# Patient Record
Sex: FEMALE | Race: WHITE | NOT HISPANIC OR LATINO | Employment: FULL TIME | URBAN - METROPOLITAN AREA
[De-identification: names, ages, dates, MRNs, and addresses within clinical notes are randomized per-mention and may not be internally consistent; named-entity substitution may affect disease eponyms.]

---
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
## 2020-11-09 LAB — POC BETA-HCG (QUAL): POSITIVE

## 2020-12-18 ENCOUNTER — HISTORICAL (OUTPATIENT)
Dept: ADMINISTRATIVE | Facility: HOSPITAL | Age: 20
End: 2020-12-18

## 2020-12-18 LAB
ABS NEUT (OLG): 7.01 X10(3)/MCL (ref 2.1–9.2)
AMPHET UR QL SCN: NEGATIVE
BARBITURATE SCN PRESENT UR: NEGATIVE
BASOPHILS # BLD AUTO: 0 X10(3)/MCL (ref 0–0.2)
BASOPHILS NFR BLD AUTO: 0 %
BENZODIAZ UR QL SCN: NEGATIVE
CANNABINOIDS UR QL SCN: NEGATIVE
COCAINE UR QL SCN: NEGATIVE
EOSINOPHIL # BLD AUTO: 0.3 X10(3)/MCL (ref 0–0.9)
EOSINOPHIL NFR BLD AUTO: 3 %
ERYTHROCYTE [DISTWIDTH] IN BLOOD BY AUTOMATED COUNT: 13.5 % (ref 11.5–17)
GROUP & RH: NORMAL
HBV SURFACE AG SERPL QL IA: NONREACTIVE
HCT VFR BLD AUTO: 34.5 % (ref 37–47)
HCV AB SERPL QL IA: NONREACTIVE
HGB BLD-MCNC: 11.3 GM/DL (ref 12–16)
HIV 1+2 AB+HIV1 P24 AG SERPL QL IA: NONREACTIVE
LYMPHOCYTES # BLD AUTO: 1.8 X10(3)/MCL (ref 0.6–4.6)
LYMPHOCYTES NFR BLD AUTO: 19 %
MCH RBC QN AUTO: 28.1 PG (ref 27–31)
MCHC RBC AUTO-ENTMCNC: 32.8 GM/DL (ref 33–36)
MCV RBC AUTO: 85.8 FL (ref 80–94)
MONOCYTES # BLD AUTO: 0.5 X10(3)/MCL (ref 0.1–1.3)
MONOCYTES NFR BLD AUTO: 5 %
NEUTROPHILS # BLD AUTO: 7.01 X10(3)/MCL (ref 2.1–9.2)
NEUTROPHILS NFR BLD AUTO: 72 %
OPIATES UR QL SCN: NEGATIVE
PCP UR QL: NEGATIVE
PH UR STRIP.AUTO: 5 [PH] (ref 5–7.5)
PLATELET # BLD AUTO: 318 X10(3)/MCL (ref 130–400)
PMV BLD AUTO: 10 FL (ref 9.4–12.4)
RBC # BLD AUTO: 4.02 X10(6)/MCL (ref 4.2–5.4)
SP GR FLD REFRACTOMETRY: >1.03 (ref 1–1.03)
T PALLIDUM AB SER QL: NONREACTIVE
T4 FREE SERPL-MCNC: 0.88 NG/DL (ref 0.7–1.48)
TSH SERPL-ACNC: 1.82 UIU/ML (ref 0.35–4.94)
WBC # SPEC AUTO: 9.7 X10(3)/MCL (ref 4.5–11.5)

## 2021-03-29 ENCOUNTER — HISTORICAL (OUTPATIENT)
Dept: ADMINISTRATIVE | Facility: HOSPITAL | Age: 21
End: 2021-03-29

## 2021-03-29 LAB — GLUCOSE 1H P 100 G GLC PO SERPL-MCNC: 111 MG/DL (ref 100–180)

## 2021-06-04 ENCOUNTER — HISTORICAL (OUTPATIENT)
Dept: ADMINISTRATIVE | Facility: HOSPITAL | Age: 21
End: 2021-06-04

## 2021-06-04 LAB
ABS NEUT (OLG): 5.56 X10(3)/MCL (ref 2.1–9.2)
BASOPHILS # BLD AUTO: 0 X10(3)/MCL (ref 0–0.2)
BASOPHILS NFR BLD AUTO: 0 %
EOSINOPHIL # BLD AUTO: 0.1 X10(3)/MCL (ref 0–0.9)
EOSINOPHIL NFR BLD AUTO: 2 %
ERYTHROCYTE [DISTWIDTH] IN BLOOD BY AUTOMATED COUNT: 14.5 % (ref 11.5–17)
HCT VFR BLD AUTO: 29.1 % (ref 37–47)
HGB BLD-MCNC: 9.8 GM/DL (ref 12–16)
HIV 1+2 AB+HIV1 P24 AG SERPL QL IA: NONREACTIVE
LYMPHOCYTES # BLD AUTO: 1.5 X10(3)/MCL (ref 0.6–4.6)
LYMPHOCYTES NFR BLD AUTO: 20 %
MCH RBC QN AUTO: 27.5 PG (ref 27–31)
MCHC RBC AUTO-ENTMCNC: 33.7 GM/DL (ref 33–36)
MCV RBC AUTO: 81.7 FL (ref 80–94)
MONOCYTES # BLD AUTO: 0.4 X10(3)/MCL (ref 0.1–1.3)
MONOCYTES NFR BLD AUTO: 6 %
NEUTROPHILS # BLD AUTO: 5.56 X10(3)/MCL (ref 2.1–9.2)
NEUTROPHILS NFR BLD AUTO: 72 %
PLATELET # BLD AUTO: 280 X10(3)/MCL (ref 130–400)
PMV BLD AUTO: 11 FL (ref 9.4–12.4)
RBC # BLD AUTO: 3.56 X10(6)/MCL (ref 4.2–5.4)
T PALLIDUM AB SER QL: NONREACTIVE
WBC # SPEC AUTO: 7.7 X10(3)/MCL (ref 4.5–11.5)

## 2021-06-07 LAB
GLUCOSE UR QL STRIP: NEGATIVE
PROTEIN, POC: NEGATIVE

## 2021-06-30 ENCOUNTER — HOSPITAL ENCOUNTER (OUTPATIENT)
Dept: NUTRITION | Facility: HOSPITAL | Age: 21
End: 2021-07-02
Attending: SURGERY | Admitting: SURGERY

## 2021-06-30 LAB
ABS NEUT (OLG): 6.34 X10(3)/MCL (ref 2.1–9.2)
ALBUMIN SERPL-MCNC: 3.1 GM/DL (ref 3.5–5)
ALBUMIN/GLOB SERPL: 0.6 RATIO (ref 1.1–2)
ALP SERPL-CCNC: 102 UNIT/L (ref 40–150)
ALT SERPL-CCNC: 15 UNIT/L (ref 0–55)
APPEARANCE, UA: ABNORMAL
AST SERPL-CCNC: 14 UNIT/L (ref 5–34)
B-HCG SERPL QL: NEGATIVE
BACTERIA SPEC CULT: ABNORMAL /HPF
BASOPHILS # BLD AUTO: 0 X10(3)/MCL (ref 0–0.2)
BASOPHILS NFR BLD AUTO: 1 %
BILIRUB SERPL-MCNC: 0.2 MG/DL
BILIRUB UR QL STRIP: NEGATIVE
BILIRUBIN DIRECT+TOT PNL SERPL-MCNC: 0.1 MG/DL (ref 0–0.5)
BILIRUBIN DIRECT+TOT PNL SERPL-MCNC: 0.1 MG/DL (ref 0–0.8)
BUN SERPL-MCNC: 10 MG/DL (ref 7–18.7)
CALCIUM SERPL-MCNC: 9.7 MG/DL (ref 8.4–10.2)
CHLORIDE SERPL-SCNC: 106 MMOL/L (ref 98–107)
CO2 SERPL-SCNC: 26 MMOL/L (ref 22–29)
COLOR UR: YELLOW
CREAT SERPL-MCNC: 0.73 MG/DL (ref 0.55–1.02)
EOSINOPHIL # BLD AUTO: 0.2 X10(3)/MCL (ref 0–0.9)
EOSINOPHIL NFR BLD AUTO: 3 %
ERYTHROCYTE [DISTWIDTH] IN BLOOD BY AUTOMATED COUNT: 13.9 % (ref 11.5–17)
GLOBULIN SER-MCNC: 5 GM/DL (ref 2.4–3.5)
GLUCOSE (UA): NEGATIVE
GLUCOSE SERPL-MCNC: 84 MG/DL (ref 74–100)
HCT VFR BLD AUTO: 34 % (ref 37–47)
HGB BLD-MCNC: 10.5 GM/DL (ref 12–16)
HGB UR QL STRIP: ABNORMAL
KETONES UR QL STRIP: NEGATIVE
LDH SERPL-CCNC: 187 UNIT/L (ref 140–271)
LEUKOCYTE ESTERASE UR QL STRIP: ABNORMAL
LIPASE SERPL-CCNC: 19 U/L
LYMPHOCYTES # BLD AUTO: 1.6 X10(3)/MCL (ref 0.6–4.6)
LYMPHOCYTES NFR BLD AUTO: 18 %
MAGNESIUM SERPL-MCNC: 2.1 MG/DL (ref 1.6–2.6)
MCH RBC QN AUTO: 26.6 PG (ref 27–31)
MCHC RBC AUTO-ENTMCNC: 30.9 GM/DL (ref 33–36)
MCV RBC AUTO: 86.1 FL (ref 80–94)
MONOCYTES # BLD AUTO: 0.5 X10(3)/MCL (ref 0.1–1.3)
MONOCYTES NFR BLD AUTO: 6 %
NEUTROPHILS # BLD AUTO: 6.34 X10(3)/MCL (ref 2.1–9.2)
NEUTROPHILS NFR BLD AUTO: 72 %
NITRITE UR QL STRIP: NEGATIVE
PH UR STRIP: 6.5 [PH] (ref 5–9)
PLATELET # BLD AUTO: 518 X10(3)/MCL (ref 130–400)
PMV BLD AUTO: 10.4 FL (ref 9.4–12.4)
POTASSIUM SERPL-SCNC: 4.2 MMOL/L (ref 3.5–5.1)
PROT SERPL-MCNC: 8.1 GM/DL (ref 6.4–8.3)
PROT UR QL STRIP: NEGATIVE
RBC # BLD AUTO: 3.95 X10(6)/MCL (ref 4.2–5.4)
RBC #/AREA URNS HPF: ABNORMAL /[HPF]
SODIUM SERPL-SCNC: 142 MMOL/L (ref 136–145)
SP GR UR STRIP: 1.02 (ref 1–1.03)
SQUAMOUS EPITHELIAL, UA: 13 /HPF (ref 0–4)
UROBILINOGEN UR STRIP-ACNC: 0.2
WBC # SPEC AUTO: 8.8 X10(3)/MCL (ref 4.5–11.5)
WBC #/AREA URNS HPF: 27 /HPF (ref 0–3)

## 2021-07-01 LAB — SARS-COV-2 AG RESP QL IA.RAPID: NEGATIVE

## 2021-07-02 LAB
ABS NEUT (OLG): 8.17 X10(3)/MCL (ref 2.1–9.2)
ALBUMIN SERPL-MCNC: 2.9 GM/DL (ref 3.5–5)
ALBUMIN/GLOB SERPL: 0.7 RATIO (ref 1.1–2)
ALP SERPL-CCNC: 258 UNIT/L (ref 40–150)
ALT SERPL-CCNC: 86 UNIT/L (ref 0–55)
AST SERPL-CCNC: 221 UNIT/L (ref 5–34)
BASOPHILS # BLD AUTO: 0 X10(3)/MCL (ref 0–0.2)
BASOPHILS NFR BLD AUTO: 0 %
BILIRUB SERPL-MCNC: 0.9 MG/DL
BILIRUBIN DIRECT+TOT PNL SERPL-MCNC: 0.4 MG/DL (ref 0–0.8)
BILIRUBIN DIRECT+TOT PNL SERPL-MCNC: 0.5 MG/DL (ref 0–0.5)
BUN SERPL-MCNC: 5.9 MG/DL (ref 7–18.7)
CALCIUM SERPL-MCNC: 9.4 MG/DL (ref 8.4–10.2)
CHLORIDE SERPL-SCNC: 106 MMOL/L (ref 98–107)
CO2 SERPL-SCNC: 23 MMOL/L (ref 22–29)
CREAT SERPL-MCNC: 0.76 MG/DL (ref 0.55–1.02)
ERYTHROCYTE [DISTWIDTH] IN BLOOD BY AUTOMATED COUNT: 13.7 % (ref 11.5–17)
EST CREAT CLEARANCE SER (OHS): 83.44 ML/MIN
FINAL CULTURE: NO GROWTH
GLOBULIN SER-MCNC: 4.2 GM/DL (ref 2.4–3.5)
GLUCOSE SERPL-MCNC: 110 MG/DL (ref 74–100)
HCT VFR BLD AUTO: 33 % (ref 37–47)
HGB BLD-MCNC: 10.3 GM/DL (ref 12–16)
LACTATE SERPL-SCNC: 1.3 MMOL/L (ref 0.5–2.2)
LACTATE SERPL-SCNC: 2.4 MMOL/L (ref 0.5–2.2)
LYMPHOCYTES # BLD AUTO: 1.2 X10(3)/MCL (ref 0.6–4.6)
LYMPHOCYTES NFR BLD AUTO: 12 %
MCH RBC QN AUTO: 26.8 PG (ref 27–31)
MCHC RBC AUTO-ENTMCNC: 31.2 GM/DL (ref 33–36)
MCV RBC AUTO: 85.7 FL (ref 80–94)
MONOCYTES # BLD AUTO: 0.7 X10(3)/MCL (ref 0.1–1.3)
MONOCYTES NFR BLD AUTO: 7 %
NEUTROPHILS # BLD AUTO: 8.17 X10(3)/MCL (ref 2.1–9.2)
NEUTROPHILS NFR BLD AUTO: 80 %
PLATELET # BLD AUTO: 497 X10(3)/MCL (ref 130–400)
PMV BLD AUTO: 10.3 FL (ref 9.4–12.4)
POTASSIUM SERPL-SCNC: 4.5 MMOL/L (ref 3.5–5.1)
PROT SERPL-MCNC: 7.1 GM/DL (ref 6.4–8.3)
RBC # BLD AUTO: 3.85 X10(6)/MCL (ref 4.2–5.4)
SODIUM SERPL-SCNC: 141 MMOL/L (ref 136–145)
WBC # SPEC AUTO: 10.2 X10(3)/MCL (ref 4.5–11.5)

## 2021-07-27 LAB — POC BETA-HCG (QUAL): NEGATIVE

## 2021-09-01 LAB — POC BETA-HCG (QUAL): NEGATIVE

## 2021-10-13 LAB — POC BETA-HCG (QUAL): NEGATIVE

## 2022-04-09 ENCOUNTER — HISTORICAL (OUTPATIENT)
Dept: ADMINISTRATIVE | Facility: HOSPITAL | Age: 22
End: 2022-04-09

## 2022-04-29 VITALS
HEIGHT: 60 IN | WEIGHT: 228 LBS | SYSTOLIC BLOOD PRESSURE: 130 MMHG | BODY MASS INDEX: 44.76 KG/M2 | DIASTOLIC BLOOD PRESSURE: 69 MMHG

## 2022-04-30 NOTE — OP NOTE
DATE OF SURGERY:    07/01/2021    SURGEON:  Chilo Alvares MD  ASSISTANT:  Arti Camacho House Officer 2    PROCEDURE:  Laparoscopic cholecystectomy.    PREOPERATIVE DIAGNOSIS:  Acute biliary colic from cholelithiasis.    POSTOPERATIVE DIAGNOSIS:  Acute biliary colic from cholelithiasis.    ANESTHESIA:  General endotracheal anesthesia with local at the sites.    COMPLICATIONS:  None.    ESTIMATED BLOOD LOSS:  Ten.    CONDITION:  Good.    SPECIMEN:  Gallbladder.    INDICATION FOR PROCEDURE:  21-year-old female with severe symptoms from cholelithiasis.  We will take her to the operating room for a laparoscopic cholecystectomy.    FINDINGS:  Mildly inflamed gallbladder.  Normal biliary anatomy.  No other abnormalities.    PROCEDURE IN DETAIL:  The patient was brought to the operating room.  She was brought under general endotracheal anesthesia.  She was prepped and draped in sterile fashion.  Antibiotics were given.  SCDs were in place and a timeout was called.  Supraumbilical Veress access to the abdomen was performed with a water drop test.  There was no evidence of bowel or trocar injury.  The abdomen was insufflated to 15 mmHg.  Three 5 mm ports and a subxiphoid 11 mm port were placed.  The gallbladder was retracted.  There was some mild edema, but for the most part, it was more consistent with acute biliary colic.  The triangle of Calot was easily dissected out.  There was an obvious cystic duct and cystic artery. Cystic duct was clipped 3 times proximally, once distally, and transected.  The cystic artery was clipped twice proximally, once distally, and transected.  The gallbladder was removed from the gallbladder bed with the Bovie cautery.  The gallbladder was removed from the abdomen with the EndoCatch bag.  The liver bed was dry.  The clips were intact.  There was no other pathology noted.  The abdomen was desufflated.  The port sites were removed.  The port sites were closed with 4-0 Vicryl.  The  subxiphoid port     was closed with 0 Vicryl in the fascia.  The patient tolerated the procedure well, was awoken without difficulty, and brought to recovery room without further event.        ______________________________  MD WALESKA Hugo/  DD:  07/01/2021  Time:  04:22PM  DT:  07/01/2021  Time:  04:43PM  Job #:  [id number]

## 2022-04-30 NOTE — ED PROVIDER NOTES
"   Patient:   Sammi Marcelino             MRN: [id number]            FIN: [id number]               Age:   21 years     Sex:  Female     :  2000   Associated Diagnoses:   None   Author:   Obey Goff MD      Basic Information   Time seen: Date & time 2021 19:06:00.   History source: Patient.   Arrival mode: Private vehicle, walking.   History limitation: None.   Additional information: Chief Complaint from Nursing Triage Note : Chief Complaint   2021 19:05 CDT      Chief Complaint           Pt. c/o midline upper abdominal pain worse after eating and R sided "rib pain," worsening after deep breathing x 1 week. Pt. reports  x 3 weeks ago.  .      History of Present Illness   The patient presents with abdominal pain, SOrT: AAOx4 female 3 weeks post partum  ambulatory into triage with c/o epigastric pain over the last week.  [facility]ALAV[facility] and     I, Dr. Goff, assumed care of this patient at 21:15.    22 y/o female presents to the ED with complaints of RUQ abdominal pain. Pt had a  3 weeks ago but notes that the pain did not start until 1 week ago. She denies nausea and vomiting and notes that the pain comes after eating..  The onset was 1  weeks ago.  The course/duration of symptoms is constant.  The character of symptoms is "pain".  The degree at onset was moderate.  The Location of pain at onset was right, upper and abdominal.  The degree at present is moderate.  The Location of pain at present is right, upper and abdominal.  Radiating pain: none. The exacerbating factor is eating.  The relieving factor is none.  Therapy today: none.  Risk factors consist of recent surgery.  Associated symptoms: denies nausea and denies vomiting.        Review of Systems   Constitutional symptoms:  No fever, no chills, no sweats, no weakness   Skin symptoms:  No rash,    Eye symptoms:  No recent vision problems   ENMT symptoms:  No ear pain,    Respiratory symptoms:  No shortness " of breath, no orthopnea   Cardiovascular symptoms:  No chest pain, no palpitations   Gastrointestinal symptoms:  RUQ pain, no nausea, no vomiting, no diarrhea   Genitourinary symptoms:  No dysuria, no hematuria.    Musculoskeletal symptoms:  No back pain, no Muscle pain.    Psychiatric symptoms:  No anxiety, no depression.    Allergy/immunologic symptoms:  No seasonal allergies, no food allergies             Additional review of systems information: All other systems reviewed and otherwise negative.      Health Status   Allergies:    Allergic Reactions (Selected)  Severity Not Documented  Cinnamon- Tingling.  No Known Medication Allergies.   Medications:  (Selected)   Prescriptions  Prescribed  Classic Prenatal oral tablet: 1 tab(s), Oral, Daily, # 30 tab(s), 11 Refill(s), Pharmacy: [id number], 152, cm, Height/Length Dosing, 11/23/20 14:21:00 CST, 106.14, kg, Weight Dosing, 11/23/20 14:21:00 CST   mg oral tablet: 600 mg = 1 tab(s), Oral, q8hr, # 30 tab(s), 0 Refill(s), Pharmacy: [facility], 152, cm, Height/Length Dosing, 06/07/21 16:32:00 CDT, 111, kg, Weight Dosing, 06/07/21 16:32:00 CDT  PNV Prenatal Plus oral tablet: 1 tab(s), Oral, Daily, # 30 tab(s), 9 Refill(s), Pharmacy: [id number], 152, cm, Height/Length Dosing, 11/09/20 9:08:00 CST, 108.86, kg, Weight Dosing, 11/09/20 9:08:00 CST  Percocet 5/325 oral tablet: 1 tab(s), Oral, q6hr, PRN PRN pain, mild, # 30 tab(s), 0 Refill(s)  ferrous sulfate 325 mg oral tablet: 325 mg = 1 tab(s), Oral, Daily, # 30 tab(s), 1 Refill(s), Pharmacy: [id number], 152, cm, Height/Length Dosing, 06/01/21 12:54:00 CDT, 111.58, kg, Weight Dosing, 06/01/21 12:54:00 CDT  medroxyPROGESTERone 150 mg/mL intramuscular suspension: 150 mg = 1 mL, IM, q3mo, # 1 mL, 3 Refill(s), Pharmacy: [facility].      Past Medical/ Family/ Social History   Medical history:    Resolved  Pregnant ([id number]):  Resolved in 2019  at 18 years..   Surgical history:     delivery only; ([id number]) on 2021 at 21 Years.   Section (.) on 2021 at 21 Years.  Comments:  2021 18:40 CDT - Vicente MARTÍNEZ, Dinora MOORE  auto-populated from documented surgical case.   Family history:    Entire family history is negative..   Social history:    Social & Psychosocial Habits    Tobacco  2021  Use: Never (less than 100 in l    Patient Wants Consult For Cessation Counseling N/A  .      Physical Examination               Vital Signs   Vital Signs   2021 19:05 CDT      Temperature Oral          37.1 DegC                             Temperature Oral (calculated)             98.78 DegF                             Peripheral Pulse Rate     99 bpm                             Respiratory Rate          20 br/min                             SpO2                      99 %                             Systolic Blood Pressure   122 mmHg                             Diastolic Blood Pressure  71 mmHg  .   Measurements   2021 19:05 CDT      Weight Dosing             100 kg                             Weight Measured and Calculated in Lbs     220.46 lb                             Weight Estimated          100 kg                             Height/Length Dosing      152 cm                             Height/Length Estimated   152 cm                             Body Mass Index Estimated 43.28 kg/m2  .   Basic Oxygen Information   2021 20:48 CDT      SpO2                      100 %                             SpO2                      100 %    2021 19:05 CDT      SpO2                      99 %  General:  Alert, no acute distress   Neurological:  Alert and oriented to person, place, time, and situation, No focal neurological deficit observed, CN II-XII intact, normal sensory observed, normal motor observed, normal speech observed   Skin:  Warm, pink, intact, moist, no rash   Head:  Normocephalic, atraumatic   Cardiovascular:  Regular rate and  rhythm, No murmur, Normal peripheral perfusion, No edema   Respiratory:  Lungs are clear to auscultation, respirations are non-labored, breath sounds are equal, Symmetrical chest wall expansion.    Musculoskeletal:  Normal ROM, normal strength   Gastrointestinal:  Soft, Non distended, Normal bowel sounds, Signs: Hooper's.    Lymphatics:  No lymphadenopathy.   Psychiatric:  Cooperative, appropriate mood & affect, normal judgment      Medical Decision Making   Rationale:  21-year-old presents for complaint of abdominal pain.  Postprandial in nature.  Has been ongoing for a week.  Physical exam findings consistent with potential cholecystitis versus symptomatic cholelithiasis.  Will obtain right upper quadrant ultrasound for further evaluation.  Will obtain labs consistent with evaluation of infection and abdominal pain.  IV fluids pain control..   Documents reviewed:  Emergency department nurses' notes.   Orders  Launch Orders   Laboratory:  LDH (Order): Stat collect, 6/30/2021 19:07 CDT, Blood, Lab Collect, Print Label By Order Location, 6/30/2021 19:07 CDT  Pregnancy Test Urine (Order): Stat collect, Urine, 6/30/2021 19:06 CDT, Nurse collect, Print Label By Order Location, 6/30/2021 19:06 CDT  Urinalysis with Reflex (Order): Stat collect, Urine, 6/30/2021 19:06 CDT, Nurse collect, Print Label By Order Location  Lipase Level (Order): Stat collect, 6/30/2021 19:06 CDT, Blood, Lab Collect, Print Label By Order Location, 6/30/2021 19:06 CDT  CMP (Order): Stat collect, 6/30/2021 19:06 CDT, Blood, Lab Collect, Print Label By Order Location, 6/30/2021 19:06 CDT  CBC w/ Auto Diff (Order): Now collect, 6/30/2021 19:06 CDT, Blood, Lab Collect, Print Label By Order Location, 6/30/2021 19:06 CDT, launch Order Profile (Selected)   Inpatient Orders  InProcess (In Process)  CMP: STAT collect, 06/30/21 19:38:29 CDT, BLOOD, Collected, Stop date 06/30/21 19:38:00 CDT, Lab Collect  Ordered  30 Day Readmission: 06/30/21 19:08:32 CDT,  "Stop date 21 19:08:32 CDT, "This patient has had an inpatient, observation, outpatient bedded or emergency visit within the last 30 days."  HT Screenin21 18:54:59 CDT  Saline Lock Insert: 21 19:12:00 CDT, Stop date 21 19:12:00 CDT  cefTRIAXone: 1,000 mg, form: Injection Uncomplicated UTI, IV Piggyback, q24hr, Infuse over: 0.5 hr, first dose 21 21:48:00 CDT  Ordered (Collected)  Urine Culture [id number]: Stat collect, 21 19:08:00 CDT, Urine, Collected, Nurse collect, [id number], Stop date 21 19:08:00 CDT, Print Label By Order Location  Ordered (Exam Started)  US Abdomen Limited: Stat, 21 20:48:00 CDT, RUQ Pain, eval for cholecystitis, None, Ambulatory, Rad Type, Schedule this test, 21 20:48:00 CDT  Ordered (In-Lab)  LDH: STAT collect, 21 19:38:29 CDT, BLOOD, Collected, Stop date 21 19:38:00 CDT, Lab Collect  Lipase Level: STAT collect, 21 19:38:29 CDT, BLOOD, Collected, Stop date 21 19:07:00 CDT, Lab Collect  Completed  Automated Diff: NOW collect, 21 19:38:00 CDT, Blood, Collected, Stop date 21 19:38:00 CDT, Lab Collect, Print Label By Order Location, 21 19:06:00 CDT  CBC w/ Auto Diff: NOW collect, 21 19:38:29 CDT, BLOOD, Collected, Stop date 21 19:38:00 CDT, Lab Collect  IVF Normal Saline NS Bolus 1000ml 1,000 mL: 1,000 mL, 1,000 mL, IV, 1000 mL/hr, order duration: 1 dose(s), start date 21 19:12:00 CDT, stop date 21 20:11:00 CDT, 1.94, m2  Magnesium Level: STAT collect, 21 19:38:29 CDT, BLOOD, Collected, Stop date 21 19:38:00 CDT, Lab Collect  Pregnancy Test Urine: Stat collect, Urine, 21 19:06:00 CDT, Stop date 21 19:07:00 CDT, Nurse collect, Print Label By Order Location, 21 19:06:00 CDT  Urinalysis with Reflex: Stat collect, Urine, 21 19:06:00 CDT, Stop date 21 19:07:00 CDT, Nurse collect, Print Label By Order Location  cefTRIAXone: 1 gm, 1 EA, " Injection, N/A, Once, Stop date 06/30/21 20:50:55 CDT, Physician Stop, 06/30/21 20:50:55 CDT.    Results review:  Lab results : Lab View   6/30/2021 19:38 CDT      Sodium Lvl                142 mmol/L                             Potassium Lvl             4.2 mmol/L                             Chloride                  106 mmol/L                             CO2                       26 mmol/L                             Calcium Lvl               9.7 mg/dL                             Magnesium Lvl             2.10 mg/dL                             Glucose Lvl               84 mg/dL                             AST                       14 unit/L                             ALT                       15 unit/L                             Total Protein             8.1 gm/dL                             Albumin Lvl               3.1 gm/dL  LOW                             Globulin                  5.0 gm/dL  HI                             A/G Ratio                 0.6 ratio  LOW                             WBC                       8.8 x10(3)/mcL                             RBC                       3.95 x10(6)/mcL  LOW                             Hgb                       10.5 gm/dL  LOW                             Hct                       34.0 %  LOW                             Platelet                  518 x10(3)/mcL  HI                             MCV                       86.1 fL                             MCH                       26.6 pg  LOW                             MCHC                      30.9 gm/dL  LOW                             RDW                       13.9 %                             MPV                       10.4 fL                             Abs Neut                  6.34 x10(3)/mcL                             Neutro Auto               72 %  NA                             Lymph Auto                18 %  NA                             Mono Auto                 6 %  NA                             Eos  Auto                  3 %  NA                             Abs Eos                   0.2 x10(3)/mcL                             Basophil Auto             1 %  NA                             Abs Neutro                6.34 x10(3)/mcL                             Abs Lymph                 1.6 x10(3)/mcL                             Abs Mono                  0.5 x10(3)/mcL                             Abs Baso                  0.0 x10(3)/mcL    6/30/2021 19:08 CDT      U Beta hCG Ql             Negative                             UA Appear                 CLOUDY                             UA Color                  YELLOW                             UA Spec Grav              1.017                             UA Bili                   Negative                             UA pH                     6.5                             UA Urobilinogen           0.2                             UA Blood                  Trace                             UA Glucose                Negative                             UA Ketones                Negative                             UA Protein                Negative                             UA Nitrite                Negative                             UA Leuk Est               2+                             UA WBC                    27 /HPF  HI                             UA RBC                    NONE SEEN                             UA Bacteria               2+ /HPF                             UA Squam Epithelial       13 /HPF  HI  .   Radiology results:  Reviewed radiologist's report, Technique:Right upper quadrant ultrasound was performed.     Comparison:None.     Clinical History:Evaluate for cholecystitis, RUQ pain.     Findings:  Liver:The liver is somewhat prominent and measures 16.9 centimeters in greatest dimension. The liver otherwise appears unremarkable.  Biliary ducts:The common bile duct is within normal limits at 5 mm in diameter.  Gallbladder:The gallbladder is non  distended with the gallbladder wall (4.5 mm) likely within normal limits given state of contraction. A few gallstones are present with the gallbladder otherwise appearing unremarkable with no pericholecystic fluid and a negative sonographic Hooper's sign.  Pancreas:The visualized portions of the pancreas appear unremarkable.  Right kidney:  Dimensions:The right kidney measures 10.3 x 4.5 x 4.3 cm and appears unremarkable.  Vascular:  Portal vein:Flow parameters are within normal limits with hepatopetal flow.  Aorta:The aorta is within normal limits.  IVC:The IVC is within normal limits.        Impression:  1. The gallbladder is non distended with the gallbladder wall (4.5 mm) likely within normal limits given state of contraction. A few gallstones are present with the gallbladder otherwise appearing unremarkable with no pericholecystic fluid and a negative sonographic Hooper's sign. Correlate with clinical and laboratory findings as regards additional evaluation and follow-up.  2. Details as above..       Impression and Plan   Diagnosis   Primary Impression:  Symptomatic Cholelithiasis   Plan   Disposition: Admit time  7/1/2021 01:43:00, Admit to Surgery, Ann LYMAN MD, Jason PAULA    Counseled: Patient, Regarding diagnosis, Regarding diagnostic results, Regarding treatment plan, Patient indicated understanding of instructions.    Notes: I, Kiarra Ortiz, acted solely as a scribe for and in the presence of Dr. Goff who performed the service. , I , Obey Goff, acknowledge that the documentation on this chart was provided by the scribe on the date of service noted above and that the documentation in the chart accurately reflects work and decisions made by me alone.

## 2022-05-02 NOTE — HISTORICAL [facility] CERNER
This is a historical note converted from Ramón. Formatting and pictures may have been removed.  Please reference Ramón for original formatting and attached multimedia. Admit and Discharge Dates  Admit Date: 06/30/2021  Discharge Date: 07/02/2021  Physicians  Attending Physician - Sulaiman LYMAN MD, Jason BAILEY  Admitting Physician - Sulaiman LYMAN MD, Jason BAILEY  Primary Care Physician - Jim JOHNSON, Alphonse SAMUELS  Discharge Diagnosis  Abdominal pain?[id number]  Symptomatic cholelithiasis?K80.20  Surgical Procedures  07/01/2021 - [id number] - Cholecystectomy Laparoscopic  Immunizations  No immunizations recorded for this visit.  Admission Information  21-year-old female with acute cholecystitis  Significant Findings  This is a?21-year-old female?with?recent?pregnancy presented with acute cholecystitis.? Underwent laparoscopic cholecystectomy?on July 1. ?She tolerated the procedure well without any complications.? On postoperative day 1, her pain was controlled, she was tolerated a regular diet, she was ambulating,?was having bowel function. ?She will have follow-up?medication acute care?on July 19?she will be discharged multiple medical pain control.  Time Spent on discharge  10 minutes  Objective  Vitals & Measurements  T:?36.6? ?C (Oral)? TMIN:?36.3? ?C (Oral)? TMAX:?36.6? ?C (Oral)? HR:?66(Peripheral)? HR:?112(Monitored)? RR:?15? BP:?120/82? SpO2:?100%?  Physical Exam  See?progress note  Patient Discharge Condition  Stable  Discharge Disposition  home   Discharge Medication Reconciliation  Prescribed  acetaminophen (acetaminophen 325 mg oral tablet)?650 mg, Oral, q4hr  docusate (docusate sodium 100 mg oral capsule)?100 mg, Oral, BID  gabapentin (gabapentin 300 mg oral capsule)?300 mg, Oral, TID  ibuprofen (ibuprofen 600 mg oral tablet)?600 mg, Oral, TID  oxyCODONE (oxycodone 5 mg oral tablet)?5 mg, Oral, q6hr, PRN pain, mild  polyethylene glycol 3350 (MiraLax)  Discontinue  acetaminophen-oxyCODONE  (Percocet 5/325 oral tablet)?1 tab(s), Oral, q6hr, PRN pain, mild  ferrous sulfate (ferrous sulfate 325 mg oral tablet)?325 mg, Oral, Daily  ibuprofen ( mg oral tablet)?600 mg, Oral, q8hr  medroxyPROGESTERone (medroxyPROGESTERone 150 mg/mL intramuscular suspension)?150 mg, IM, q3mo  multivitamin, prenatal (Classic Prenatal oral tablet)?1 tab(s), Oral, Daily  multivitamin, prenatal (PNV Prenatal Plus oral tablet)?1 tab(s), Oral, Daily  Education and Orders Provided  Incision Care, Adult, Easy-to-Read  Wound Care, Adult  Cholecystectomy, Adult, Care After Surgery (Custom)  Discharge - 07/02/21 11:18:00 CDT, Home, Give all scheduled vaccinations prior to discharge.?  Discharge Activity - Activity as Tolerated?  Discharge Diet - Regular?  Discharge Wound Care - 07/02/21 11:18:00 CDT, 1-2x/Day, Keep wounds clean and dry. No swimming or tub soaks for 2 weeks. Shower daily. No lifting greater then 20 lbs for 4 weeks.?  Follow up  [facility]  ????Office will call you with a telemed visit on 7/19/21. Please call with any questions or problems  Car Seat Challenge  No Qualifying Data

## 2022-05-02 NOTE — HISTORICAL [facility] CERNER
This is a historical note converted from [employer]. Formatting and pictures may have been removed.  Please reference [employer] for original formatting and attached multimedia. Chief Complaint  Pt. c/o midline upper abdominal pain worse after eating and R sided rib pain, worsening after deep breathing x 1 week. Pt. reports  x 3 weeks ago.  History of Present Illness  Patient is a 22 yo F w/ PMH of recent  (3 weeks ago) who presents to the ED with one week of intermittent RUQ/epigastric pain.? Pain is exacerbated with PO intake.? She reports associated nausea, but no vomiting.? She has had similar symptoms in the past, but did not have any medical intervention.? On arrival, patient is AF, VSS.? Laboratory evaluation is unremarkable.? No leukocytosis, no transaminitis or elevation of tbili.? US demonstrating cholelithiasis.? CBD 5.4 mm, wall 4.5 mm.  Review of Systems  Negative unless otherwise specified in HPI  Physical Exam  Vitals & Measurements  T:?37.1? ?C (Oral)? HR:?94(Peripheral)? HR:?94(Monitored)? RR:?19? BP:?121/82? SpO2:?100%? WT:?100?kg?  NAD, non toxic  no scleral icterus, OP mucosa pink/moist  AAOx3  RRR  normal work of breathing  abd soft, non distended, moderate epigastric tenderness, +Vivian  Assessment/Plan  22 yo F, recently postpartum, who presents with cholecystitis  ?  -IVF/NPO  -DVT ppx  -IV abx  -RBA of lap jose a discussed  -posted for OR in AM  ?  Agree with the above assessment and plan. Patient seen and discussed with team.  Jaye jose a in AM.   Problem List/Past Medical History  Ongoing  Morbid obesity  Pregnant  Historical  Pregnant  Procedure/Surgical History   delivery only; (2021)   Section (.) (2021)  Drainage of Amniotic Fluid, Therapeutic from Products of Conception, Via Natural or Artificial Opening (2021)  Extraction of Products of Conception, Low, Open Approach (2021)  Introduction of Adhesion Barrier into Female Reproductive, Open  Approach (06/08/2021)  Introduction of Hormone into Female Reproductive, Via Natural or Artificial Opening (06/08/2021)  Introduction of Other Hormone into Peripheral Vein, Percutaneous Approach (06/08/2021)   Medications  Inpatient  cefTRIAXone  Home  Classic Prenatal oral tablet, 1 tab(s), Oral, Daily, 11 refills,? ?Not Taking, Completed Rx  ferrous sulfate 325 mg oral tablet, 325 mg= 1 tab(s), Oral, Daily, 1 refills,? ?Not Taking, Completed Rx   mg oral tablet, 600 mg= 1 tab(s), Oral, q8hr,? ?Not Taking, Completed Rx  medroxyPROGESTERone 150 mg/mL intramuscular suspension, 150 mg= 1 mL, IM, q3mo, 3 refills,? ?Not Taking, Completed Rx  Percocet 5/325 oral tablet, 1 tab(s), Oral, q6hr, PRN,? ?Not Taking, Completed Rx  PNV Prenatal Plus oral tablet, 1 tab(s), Oral, Daily, 9 refills,? ?Not Taking, Completed Rx  Allergies  Cinnamon?(Tingling)  No Known Medication Allergies  Social History  Abuse/Neglect  No, 06/28/2021  No, 06/21/2021  No, 06/07/2021  No, 06/01/2021  No, No, Yes, 05/17/2021  No, 05/03/2021  No, 04/19/2021  No, 04/05/2021  No, 03/19/2021  No, 03/08/2021  No, 02/09/2021  No, No, Yes, 01/25/2021  No, 12/21/2020  No, 11/23/2020  No, 11/09/2020  No, 09/12/2019  No, 09/01/2019  Tobacco  Never (less than 100 in lifetime), N/A, 06/28/2021  Never (less than 100 in lifetime), N/A, 06/21/2021  Never (less than 100 in lifetime), N/A, 06/07/2021  Never (less than 100 in lifetime), No, 06/07/2021  Never (less than 100 in lifetime), No, 06/01/2021  Never (less than 100 in lifetime), No, 05/17/2021  Never (less than 100 in lifetime), No, 05/03/2021  Never (less than 100 in lifetime), No, 04/19/2021  Never (less than 100 in lifetime), No, 04/05/2021  Never (less than 100 in lifetime), No, 03/19/2021  Never (less than 100 in lifetime), No, 03/08/2021  Never (less than 100 in lifetime), No, 02/09/2021  Never (less than 100 in lifetime), No, 01/25/2021  Never (less than 100 in lifetime), No, 12/21/2020  Never  (less than 100 in lifetime), No, 11/23/2020  Never (less than 100 in lifetime), No, 11/09/2020  Never (less than 100 in lifetime), No, 11/09/2020  Never (less than 100 in lifetime), No, 09/12/2019  Never (less than 100 in lifetime), No, 03/19/2019  Never (less than 100 in lifetime), No, 02/19/2019  Family History  Family history is negative

## 2022-09-06 ENCOUNTER — HOSPITAL ENCOUNTER (EMERGENCY)
Facility: HOSPITAL | Age: 22
Discharge: HOME | End: 2022-09-06
Attending: STUDENT IN AN ORGANIZED HEALTH CARE EDUCATION/TRAINING PROGRAM
Payer: COMMERCIAL

## 2022-09-06 VITALS
RESPIRATION RATE: 18 BRPM | OXYGEN SATURATION: 100 % | SYSTOLIC BLOOD PRESSURE: 111 MMHG | DIASTOLIC BLOOD PRESSURE: 69 MMHG | HEART RATE: 105 BPM | TEMPERATURE: 99 F

## 2022-09-06 DIAGNOSIS — N64.52 NIPPLE DISCHARGE: ICD-10-CM

## 2022-09-06 DIAGNOSIS — N64.4 BREAST PAIN: Primary | ICD-10-CM

## 2022-09-06 LAB
ALBUMIN SERPL-MCNC: 3.9 GM/DL (ref 3.5–5)
ALBUMIN/GLOB SERPL: 1 RATIO (ref 1.1–2)
ALP SERPL-CCNC: 80 UNIT/L (ref 40–150)
ALT SERPL-CCNC: 14 UNIT/L (ref 0–55)
AST SERPL-CCNC: 13 UNIT/L (ref 5–34)
B-HCG SERPL QL: NEGATIVE
BASOPHILS # BLD AUTO: 0.06 X10(3)/MCL (ref 0–0.2)
BASOPHILS NFR BLD AUTO: 0.5 %
BILIRUBIN DIRECT+TOT PNL SERPL-MCNC: 0.3 MG/DL
BUN SERPL-MCNC: 13.5 MG/DL (ref 7–18.7)
CALCIUM SERPL-MCNC: 9.7 MG/DL (ref 8.4–10.2)
CHLORIDE SERPL-SCNC: 105 MMOL/L (ref 98–107)
CO2 SERPL-SCNC: 26 MMOL/L (ref 22–29)
CREAT SERPL-MCNC: 0.8 MG/DL (ref 0.55–1.02)
EOSINOPHIL # BLD AUTO: 0.25 X10(3)/MCL (ref 0–0.9)
EOSINOPHIL NFR BLD AUTO: 2.3 %
ERYTHROCYTE [DISTWIDTH] IN BLOOD BY AUTOMATED COUNT: 13.2 % (ref 11.5–17)
GFR SERPLBLD CREATININE-BSD FMLA CKD-EPI: >60 MLS/MIN/1.73/M2
GLOBULIN SER-MCNC: 3.9 GM/DL (ref 2.4–3.5)
GLUCOSE SERPL-MCNC: 82 MG/DL (ref 74–100)
HCT VFR BLD AUTO: 41.3 % (ref 37–47)
HGB BLD-MCNC: 13 GM/DL (ref 12–16)
IMM GRANULOCYTES # BLD AUTO: 0.03 X10(3)/MCL (ref 0–0.04)
IMM GRANULOCYTES NFR BLD AUTO: 0.3 %
LYMPHOCYTES # BLD AUTO: 2.22 X10(3)/MCL (ref 0.6–4.6)
LYMPHOCYTES NFR BLD AUTO: 20 %
MCH RBC QN AUTO: 28.1 PG (ref 27–31)
MCHC RBC AUTO-ENTMCNC: 31.5 MG/DL (ref 33–36)
MCV RBC AUTO: 89.4 FL (ref 80–94)
MONOCYTES # BLD AUTO: 0.6 X10(3)/MCL (ref 0.1–1.3)
MONOCYTES NFR BLD AUTO: 5.4 %
NEUTROPHILS # BLD AUTO: 8 X10(3)/MCL (ref 2.1–9.2)
NEUTROPHILS NFR BLD AUTO: 71.5 %
NRBC BLD AUTO-RTO: 0 %
PLATELET # BLD AUTO: 404 X10(3)/MCL (ref 130–400)
PMV BLD AUTO: 10.4 FL (ref 7.4–10.4)
POTASSIUM SERPL-SCNC: 3.8 MMOL/L (ref 3.5–5.1)
PROT SERPL-MCNC: 7.8 GM/DL (ref 6.4–8.3)
RBC # BLD AUTO: 4.62 X10(6)/MCL (ref 4.2–5.4)
SODIUM SERPL-SCNC: 140 MMOL/L (ref 136–145)
WBC # SPEC AUTO: 11.1 X10(3)/MCL (ref 4.5–11.5)

## 2022-09-06 PROCEDURE — 81025 URINE PREGNANCY TEST: CPT | Performed by: PHYSICIAN ASSISTANT

## 2022-09-06 PROCEDURE — 80053 COMPREHEN METABOLIC PANEL: CPT | Performed by: PHYSICIAN ASSISTANT

## 2022-09-06 PROCEDURE — 85025 COMPLETE CBC W/AUTO DIFF WBC: CPT | Performed by: PHYSICIAN ASSISTANT

## 2022-09-06 PROCEDURE — 99283 EMERGENCY DEPT VISIT LOW MDM: CPT

## 2022-09-06 PROCEDURE — 36415 COLL VENOUS BLD VENIPUNCTURE: CPT | Performed by: PHYSICIAN ASSISTANT

## 2022-09-06 RX ORDER — DICLOXACILLIN SODIUM 500 MG/1
500 CAPSULE ORAL 3 TIMES DAILY
Qty: 21 CAPSULE | Refills: 0 | Status: SHIPPED | OUTPATIENT
Start: 2022-09-06 | End: 2022-09-13

## 2022-09-06 NOTE — DISCHARGE INSTRUCTIONS
Take the antibiotic (dicloxacillin) as directed for 7 days. Warm compresses to breast/nipple. Keep clean. Follow up with either primary care and/or obgyn. If needed may follow up with Dr. Hammer (breast specialist) if continues to return with discharge.

## 2022-09-06 NOTE — Clinical Note
"Sammi"Jitendra Marcelino was seen and treated in our emergency department on 9/6/2022.  She may return to work on 09/07/2022.       If you have any questions or concerns, please don't hesitate to call.       LPN    "

## 2022-09-06 NOTE — FIRST PROVIDER EVALUATION
Medical screening exam completed.  I have conducted a focused provider triage encounter, findings are as follows:    Brief history of present illness:  23 yo female presents to ED for evaluation of right nipple pain and discharge intermittent for the past month. Reports to having pimple that she popped initially. Denies any fever. Called GYN and told to come to ED.    Vitals:    09/06/22 1438   BP: 111/69   Pulse: 105   Resp: 18   Temp: 98.8 °F (37.1 °C)   TempSrc: Oral   SpO2: 100%       Pertinent physical exam:  Awake, alert and oriented    Brief workup plan:  Labs, provider evaluation    Preliminary workup initiated; this workup will be continued and followed by the physician or advanced practice provider that is assigned to the patient when roomed.

## 2022-09-07 NOTE — ED PROVIDER NOTES
Encounter Date: 9/6/2022       History     Chief Complaint   Patient presents with    Breast Discharge     Pt to ER via POV for right nipple discharge.  Pt states started 1 month ago.  Was told to come to ER by OB/GYN     21 y/o female presents with right nipple drainage (pus and blood) that she noticed since the weekend. States the hard area on her nipple is smaller now that it drained some. She had similar episode a month ago but it drained and pain improved so she thought it resolved. No fever. No redness, no swelling. +pain.     The history is provided by the patient. No  was used.   Abscess   This is a recurrent problem. The current episode started two days ago. The problem occurs rarely. The problem has been gradually improving. Affected Location: right nipple. The pain is at a severity of 3/10. The abscess is characterized by painfulness and draining.   Review of patient's allergies indicates:  No Known Allergies  No past medical history on file.  No past surgical history on file.  No family history on file.     Review of Systems   Skin:         Right nipple drainage   All other systems reviewed and are negative.    Physical Exam     Initial Vitals [09/06/22 1438]   BP Pulse Resp Temp SpO2   111/69 105 18 98.8 °F (37.1 °C) 100 %      MAP       --         Physical Exam    Nursing note and vitals reviewed.  Constitutional: She appears well-developed and well-nourished.   Eyes: Conjunctivae are normal.   Cardiovascular:  Regular rhythm.           Pulmonary/Chest: No respiratory distress.   Right breast exhibits nipple discharge and tenderness. Right breast exhibits no inverted nipple, no mass and no skin change. Left breast exhibits no nipple discharge.   Musculoskeletal:         General: Normal range of motion.     Neurological: She is alert and oriented to person, place, and time.   Skin: Skin is warm and dry.   Psychiatric: She has a normal mood and affect.       ED Course    Procedures  Labs Reviewed   COMPREHENSIVE METABOLIC PANEL - Abnormal; Notable for the following components:       Result Value    Globulin 3.9 (*)     Albumin/Globulin Ratio 1.0 (*)     All other components within normal limits   CBC WITH DIFFERENTIAL - Abnormal; Notable for the following components:    MCHC 31.5 (*)     Platelet 404 (*)     All other components within normal limits   PREGNANCY TEST, URINE RAPID - Normal   CBC W/ AUTO DIFFERENTIAL    Narrative:     The following orders were created for panel order CBC auto differential.  Procedure                               Abnormality         Status                     ---------                               -----------         ------                     CBC with Differential[[id number]]        Abnormal            Final result                 Please view results for these tests on the individual orders.          Imaging Results    None          Medications - No data to display  Medical Decision Making:   Clinical Tests:   Lab Tests: Ordered and Reviewed  ED Management:  Afebrile, labs wnl, no obvious abscess but does have some drainage from nipple. Will cover with dicloxacillin and send referral to breast surgeon due to repeat abscess/drainage to right nipple  Additional MDM:   Differential Diagnosis:   Other: The following diagnoses were also considered and will be evaluated: breast abscess, nipple discharge.                   Clinical Impression:   Final diagnoses:  [N64.4] Breast pain (Primary)  [N64.52] Nipple discharge - right        ED Disposition Condition    Discharge Stable          ED Prescriptions       Medication Sig Dispense Start Date End Date Auth. Provider    dicloxacillin (DYNAPEN) 500 MG capsule Take 1 capsule (500 mg total) by mouth 3 (three) times daily. for 7 days 21 capsule 9/6/2022 9/13/2022 JANESSA Strange          Follow-up Information       Follow up With Specialties Details Why Contact Info    Anisha Hammer MD Breast Surgery,  General Surgery Call in 3 weeks If symptoms worsen [id number] [facility] [id number]  [phone]               Kaley Hess, JANESSA  09/06/22 8104

## 2022-09-16 ENCOUNTER — HISTORICAL (OUTPATIENT)
Dept: ADMINISTRATIVE | Facility: HOSPITAL | Age: 22
End: 2022-09-16
Payer: COMMERCIAL

## 2024-06-18 PROCEDURE — 87661 TRICHOMONAS VAGINALIS AMPLIF: CPT | Performed by: OBSTETRICS & GYNECOLOGY

## 2024-06-18 PROCEDURE — 87624 HPV HI-RISK TYP POOLED RSLT: CPT | Performed by: OBSTETRICS & GYNECOLOGY

## 2024-06-18 PROCEDURE — 87491 CHLMYD TRACH DNA AMP PROBE: CPT | Performed by: OBSTETRICS & GYNECOLOGY

## 2024-06-18 PROCEDURE — 87591 N.GONORRHOEAE DNA AMP PROB: CPT | Performed by: OBSTETRICS & GYNECOLOGY

## 2025-07-25 ENCOUNTER — LAB VISIT (OUTPATIENT)
Dept: LAB | Facility: HOSPITAL | Age: 25
End: 2025-07-25
Attending: OBSTETRICS & GYNECOLOGY
Payer: COMMERCIAL

## 2025-07-25 DIAGNOSIS — Z01.419 GYNECOLOGIC EXAM NORMAL: ICD-10-CM

## 2025-07-25 LAB — B-HCG FREE SERPL-ACNC: 50.41 MIU/ML

## 2025-07-25 PROCEDURE — 84702 CHORIONIC GONADOTROPIN TEST: CPT

## 2025-07-25 PROCEDURE — 36415 COLL VENOUS BLD VENIPUNCTURE: CPT

## 2025-08-05 ENCOUNTER — HOSPITAL ENCOUNTER (EMERGENCY)
Facility: HOSPITAL | Age: 25
Discharge: HOME | End: 2025-08-05
Attending: EMERGENCY MEDICINE
Payer: COMMERCIAL

## 2025-08-05 VITALS
SYSTOLIC BLOOD PRESSURE: 138 MMHG | DIASTOLIC BLOOD PRESSURE: 72 MMHG | OXYGEN SATURATION: 99 % | HEIGHT: 60 IN | RESPIRATION RATE: 20 BRPM | TEMPERATURE: 99 F | HEART RATE: 86 BPM | WEIGHT: 250 LBS | BODY MASS INDEX: 49.08 KG/M2

## 2025-08-05 DIAGNOSIS — N93.9 VAGINAL BLEEDING: ICD-10-CM

## 2025-08-05 DIAGNOSIS — O20.9 VAGINAL BLEEDING AFFECTING EARLY PREGNANCY: Primary | ICD-10-CM

## 2025-08-05 LAB
ALBUMIN SERPL-MCNC: 3.5 G/DL (ref 3.5–5)
ALBUMIN/GLOB SERPL: 0.8 RATIO (ref 1.1–2)
ALP SERPL-CCNC: 70 UNIT/L (ref 40–150)
ALT SERPL-CCNC: 13 UNIT/L (ref 0–55)
ANION GAP SERPL CALC-SCNC: 9 MEQ/L
AST SERPL-CCNC: 16 UNIT/L (ref 11–45)
B-HCG FREE SERPL-ACNC: 8409.5 MIU/ML
B-HCG UR QL: POSITIVE
BACTERIA #/AREA URNS AUTO: ABNORMAL /HPF
BASOPHILS # BLD AUTO: 0.05 X10(3)/MCL
BASOPHILS NFR BLD AUTO: 0.4 %
BILIRUB SERPL-MCNC: 0.3 MG/DL
BILIRUB UR QL STRIP.AUTO: NEGATIVE
BUN SERPL-MCNC: 7.8 MG/DL (ref 7–18.7)
CALCIUM SERPL-MCNC: 9.1 MG/DL (ref 8.4–10.2)
CAOX CRY UR QL COMP ASSIST: ABNORMAL
CHLORIDE SERPL-SCNC: 105 MMOL/L (ref 98–107)
CLARITY UR: ABNORMAL
CO2 SERPL-SCNC: 25 MMOL/L (ref 22–29)
COLOR UR AUTO: ABNORMAL
CREAT SERPL-MCNC: 0.7 MG/DL (ref 0.55–1.02)
CREAT/UREA NIT SERPL: 11
EOSINOPHIL # BLD AUTO: 0.39 X10(3)/MCL (ref 0–0.9)
EOSINOPHIL NFR BLD AUTO: 3.5 %
ERYTHROCYTE [DISTWIDTH] IN BLOOD BY AUTOMATED COUNT: 12.9 % (ref 11.5–17)
GFR SERPLBLD CREATININE-BSD FMLA CKD-EPI: >60 ML/MIN/1.73/M2
GLOBULIN SER-MCNC: 4.3 GM/DL (ref 2.4–3.5)
GLUCOSE SERPL-MCNC: 86 MG/DL (ref 74–100)
GLUCOSE UR QL STRIP: NORMAL
GROUP & RH: NORMAL
HCT VFR BLD AUTO: 37.1 % (ref 37–47)
HGB BLD-MCNC: 12.3 G/DL (ref 12–16)
HGB UR QL STRIP: ABNORMAL
IMM GRANULOCYTES # BLD AUTO: 0.04 X10(3)/MCL (ref 0–0.04)
IMM GRANULOCYTES NFR BLD AUTO: 0.4 %
KETONES UR QL STRIP: NEGATIVE
LEUKOCYTE ESTERASE UR QL STRIP: NEGATIVE
LYMPHOCYTES # BLD AUTO: 2.48 X10(3)/MCL (ref 0.6–4.6)
LYMPHOCYTES NFR BLD AUTO: 22 %
MCH RBC QN AUTO: 29.2 PG (ref 27–31)
MCHC RBC AUTO-ENTMCNC: 33.2 G/DL (ref 33–36)
MCV RBC AUTO: 88.1 FL (ref 80–94)
MONOCYTES # BLD AUTO: 0.74 X10(3)/MCL (ref 0.1–1.3)
MONOCYTES NFR BLD AUTO: 6.6 %
MUCOUS THREADS URNS QL MICRO: ABNORMAL /LPF
NEUTROPHILS # BLD AUTO: 7.55 X10(3)/MCL (ref 2.1–9.2)
NEUTROPHILS NFR BLD AUTO: 67.1 %
NITRITE UR QL STRIP: NEGATIVE
NRBC BLD AUTO-RTO: 0 %
PH UR STRIP: 5.5 [PH]
PLATELET # BLD AUTO: 358 X10(3)/MCL (ref 130–400)
PMV BLD AUTO: 9.9 FL (ref 7.4–10.4)
POTASSIUM SERPL-SCNC: 3.8 MMOL/L (ref 3.5–5.1)
PROT SERPL-MCNC: 7.8 GM/DL (ref 6.4–8.3)
PROT UR QL STRIP: NEGATIVE
RBC # BLD AUTO: 4.21 X10(6)/MCL (ref 4.2–5.4)
RBC #/AREA URNS AUTO: ABNORMAL /HPF
SODIUM SERPL-SCNC: 139 MMOL/L (ref 136–145)
SP GR UR STRIP.AUTO: 1.03 (ref 1–1.03)
SQUAMOUS #/AREA URNS LPF: ABNORMAL /HPF
UROBILINOGEN UR STRIP-ACNC: NORMAL
WBC # BLD AUTO: 11.25 X10(3)/MCL (ref 4.5–11.5)
WBC #/AREA URNS AUTO: ABNORMAL /HPF

## 2025-08-05 PROCEDURE — 81015 MICROSCOPIC EXAM OF URINE: CPT

## 2025-08-05 PROCEDURE — 86901 BLOOD TYPING SEROLOGIC RH(D): CPT

## 2025-08-05 PROCEDURE — 85025 COMPLETE CBC W/AUTO DIFF WBC: CPT

## 2025-08-05 PROCEDURE — 99284 EMERGENCY DEPT VISIT MOD MDM: CPT | Mod: 25

## 2025-08-05 PROCEDURE — 80053 COMPREHEN METABOLIC PANEL: CPT

## 2025-08-05 PROCEDURE — 81025 URINE PREGNANCY TEST: CPT

## 2025-08-05 PROCEDURE — 84702 CHORIONIC GONADOTROPIN TEST: CPT

## 2025-08-19 ENCOUNTER — LAB VISIT (OUTPATIENT)
Dept: LAB | Facility: HOSPITAL | Age: 25
End: 2025-08-19
Attending: OBSTETRICS & GYNECOLOGY
Payer: COMMERCIAL

## 2025-08-19 DIAGNOSIS — O09.91 ENCOUNTER FOR SUPERVISION OF HIGH RISK PREGNANCY IN FIRST TRIMESTER, ANTEPARTUM: ICD-10-CM

## 2025-08-19 DIAGNOSIS — Z03.89 ENCNTR FOR OBS FOR OTH SUSPECTED DISEASES AND COND RULED OUT: ICD-10-CM

## 2025-08-19 LAB
BASOPHILS # BLD AUTO: 0.05 X10(3)/MCL
BASOPHILS NFR BLD AUTO: 0.5 %
EOSINOPHIL # BLD AUTO: 0.54 X10(3)/MCL (ref 0–0.9)
EOSINOPHIL NFR BLD AUTO: 5.4 %
ERYTHROCYTE [DISTWIDTH] IN BLOOD BY AUTOMATED COUNT: 13.1 % (ref 11.5–17)
GROUP & RH: NORMAL
HBV SURFACE AG SERPL QL IA: NONREACTIVE
HCT VFR BLD AUTO: 36.9 % (ref 37–47)
HCV AB SERPL QL IA: NONREACTIVE
HGB BLD-MCNC: 12.4 G/DL (ref 12–16)
HIV 1+2 AB+HIV1 P24 AG SERPL QL IA: NONREACTIVE
IMM GRANULOCYTES # BLD AUTO: 0.03 X10(3)/MCL (ref 0–0.04)
IMM GRANULOCYTES NFR BLD AUTO: 0.3 %
INDIRECT COOMBS: NORMAL
LYMPHOCYTES # BLD AUTO: 1.69 X10(3)/MCL (ref 0.6–4.6)
LYMPHOCYTES NFR BLD AUTO: 16.9 %
MCH RBC QN AUTO: 29.5 PG (ref 27–31)
MCHC RBC AUTO-ENTMCNC: 33.6 G/DL (ref 33–36)
MCV RBC AUTO: 87.9 FL (ref 80–94)
MONOCYTES # BLD AUTO: 0.53 X10(3)/MCL (ref 0.1–1.3)
MONOCYTES NFR BLD AUTO: 5.3 %
NEUTROPHILS # BLD AUTO: 7.15 X10(3)/MCL (ref 2.1–9.2)
NEUTROPHILS NFR BLD AUTO: 71.6 %
NRBC BLD AUTO-RTO: 0 %
PLATELET # BLD AUTO: 353 X10(3)/MCL (ref 130–400)
PMV BLD AUTO: 9.4 FL (ref 7.4–10.4)
PROGEST SERPL-MCNC: 11.6 NG/ML
RBC # BLD AUTO: 4.2 X10(6)/MCL (ref 4.2–5.4)
SPECIMEN OUTDATE: NORMAL
T PALLIDUM AB SER QL: NONREACTIVE
T4 FREE SERPL-MCNC: 0.86 NG/DL (ref 0.7–1.48)
TSH SERPL-ACNC: 2.12 UIU/ML (ref 0.35–4.94)
WBC # BLD AUTO: 9.99 X10(3)/MCL (ref 4.5–11.5)

## 2025-08-19 PROCEDURE — 84439 ASSAY OF FREE THYROXINE: CPT

## 2025-08-19 PROCEDURE — 86787 VARICELLA-ZOSTER ANTIBODY: CPT

## 2025-08-19 PROCEDURE — 86780 TREPONEMA PALLIDUM: CPT

## 2025-08-19 PROCEDURE — 87389 HIV-1 AG W/HIV-1&-2 AB AG IA: CPT

## 2025-08-19 PROCEDURE — 86803 HEPATITIS C AB TEST: CPT

## 2025-08-19 PROCEDURE — 84443 ASSAY THYROID STIM HORMONE: CPT

## 2025-08-19 PROCEDURE — 87340 HEPATITIS B SURFACE AG IA: CPT

## 2025-08-19 PROCEDURE — 86900 BLOOD TYPING SEROLOGIC ABO: CPT | Performed by: OBSTETRICS & GYNECOLOGY

## 2025-08-19 PROCEDURE — 85025 COMPLETE CBC W/AUTO DIFF WBC: CPT

## 2025-08-19 PROCEDURE — 86762 RUBELLA ANTIBODY: CPT

## 2025-08-19 PROCEDURE — 36415 COLL VENOUS BLD VENIPUNCTURE: CPT

## 2025-08-19 PROCEDURE — 84144 ASSAY OF PROGESTERONE: CPT

## 2025-08-21 LAB
RUBV IGG SERPL IA-ACNC: 1.4
RUBV IGG SERPL QL IA: POSITIVE
VZV IGG SER IA-ACNC: 0.2
VZV IGG SER QL IA: NEGATIVE